# Patient Record
Sex: MALE | Race: WHITE | NOT HISPANIC OR LATINO | URBAN - METROPOLITAN AREA
[De-identification: names, ages, dates, MRNs, and addresses within clinical notes are randomized per-mention and may not be internally consistent; named-entity substitution may affect disease eponyms.]

---

## 2022-10-06 ENCOUNTER — TRANSFERRED RECORDS (OUTPATIENT)
Dept: HEALTH INFORMATION MANAGEMENT | Facility: CLINIC | Age: 47
End: 2022-10-06

## 2022-10-19 ENCOUNTER — MEDICAL CORRESPONDENCE (OUTPATIENT)
Dept: HEALTH INFORMATION MANAGEMENT | Facility: CLINIC | Age: 47
End: 2022-10-19

## 2022-10-19 ENCOUNTER — TRANSFERRED RECORDS (OUTPATIENT)
Dept: HEALTH INFORMATION MANAGEMENT | Facility: CLINIC | Age: 47
End: 2022-10-19

## 2022-10-25 ENCOUNTER — TRANSCRIBE ORDERS (OUTPATIENT)
Dept: OTHER | Age: 47
End: 2022-10-25

## 2022-10-25 DIAGNOSIS — H47.10 OPTIC DISC EDEMA: Primary | ICD-10-CM

## 2022-11-02 ENCOUNTER — OFFICE VISIT (OUTPATIENT)
Dept: OPHTHALMOLOGY | Facility: CLINIC | Age: 47
End: 2022-11-02
Attending: OPHTHALMOLOGY
Payer: COMMERCIAL

## 2022-11-02 DIAGNOSIS — H35.063 RETINAL VASCULITIS OF BOTH EYES: ICD-10-CM

## 2022-11-02 DIAGNOSIS — Z79.899 HIGH RISK MEDICATION USE: ICD-10-CM

## 2022-11-02 DIAGNOSIS — H35.353 CYSTOID MACULAR EDEMA OF BOTH EYES: ICD-10-CM

## 2022-11-02 DIAGNOSIS — H30.23 INTERMEDIATE UVEITIS OF BOTH EYES: Primary | ICD-10-CM

## 2022-11-02 DIAGNOSIS — H47.10 EDEMA OF OPTIC DISC OF RIGHT EYE: ICD-10-CM

## 2022-11-02 PROCEDURE — 92235 FLUORESCEIN ANGRPH MLTIFRAME: CPT | Performed by: OPHTHALMOLOGY

## 2022-11-02 PROCEDURE — 99204 OFFICE O/P NEW MOD 45 MIN: CPT | Mod: GC | Performed by: OPHTHALMOLOGY

## 2022-11-02 PROCEDURE — 92133 CPTRZD OPH DX IMG PST SGM ON: CPT | Performed by: OPHTHALMOLOGY

## 2022-11-02 PROCEDURE — 99207 OCT OPTIC NERVE RNFL SPECTRALIS OU (BOTH EYES): CPT | Mod: 26 | Performed by: OPHTHALMOLOGY

## 2022-11-02 PROCEDURE — 92134 CPTRZ OPH DX IMG PST SGM RTA: CPT | Performed by: OPHTHALMOLOGY

## 2022-11-02 PROCEDURE — G0463 HOSPITAL OUTPT CLINIC VISIT: HCPCS | Mod: 25

## 2022-11-02 RX ORDER — PREDNISOLONE ACETATE 10 MG/ML
1 SUSPENSION/ DROPS OPHTHALMIC 2 TIMES DAILY
COMMUNITY
Start: 2022-10-07 | End: 2022-11-02

## 2022-11-02 RX ORDER — PREDNISOLONE ACETATE 10 MG/ML
1 SUSPENSION/ DROPS OPHTHALMIC 2 TIMES DAILY
Qty: 10 ML | Refills: 4 | Status: SHIPPED | OUTPATIENT
Start: 2022-11-02 | End: 2023-02-03

## 2022-11-02 ASSESSMENT — CONF VISUAL FIELD
OD_NORMAL: 1
OS_INFERIOR_TEMPORAL_RESTRICTION: 0
OS_SUPERIOR_NASAL_RESTRICTION: 0
OD_INFERIOR_NASAL_RESTRICTION: 0
OS_INFERIOR_NASAL_RESTRICTION: 0
OD_SUPERIOR_NASAL_RESTRICTION: 0
METHOD: COUNTING FINGERS
OS_NORMAL: 1
OD_INFERIOR_TEMPORAL_RESTRICTION: 0
OD_SUPERIOR_TEMPORAL_RESTRICTION: 0
OS_SUPERIOR_TEMPORAL_RESTRICTION: 0

## 2022-11-02 ASSESSMENT — EXTERNAL EXAM - RIGHT EYE: OD_EXAM: NORMAL

## 2022-11-02 ASSESSMENT — TONOMETRY
OS_IOP_MMHG: 14
OD_IOP_MMHG: 12
IOP_METHOD: TONOPEN

## 2022-11-02 ASSESSMENT — VISUAL ACUITY
OD_SC: 20/20
METHOD: SNELLEN - LINEAR
OS_SC: 20/25

## 2022-11-02 ASSESSMENT — EXTERNAL EXAM - LEFT EYE: OS_EXAM: NORMAL

## 2022-11-02 ASSESSMENT — SLIT LAMP EXAM - LIDS
COMMENTS: NORMAL
COMMENTS: NORMAL

## 2022-11-02 ASSESSMENT — CUP TO DISC RATIO
OS_RATIO: 0.2
OD_RATIO: 0.1

## 2022-11-02 NOTE — PATIENT INSTRUCTIONS
We will ask you to get call [facility] in [place] to recheck Quantiferon (TB Test) and a chest x-ray to ensure these are normal. Once we get results, we will likely start a course of oral steroid. We will be in touch about this  Continue the same drop 2x/day in each eye for now. This was refilled to your pharmacy

## 2022-11-02 NOTE — NURSING NOTE
Chief Complaints and History of Present Illnesses   Patient presents with     Iritis Evaluation     Chief Complaint(s) and History of Present Illness(es)     Iritis Evaluation            Laterality: left eye    Onset: gradual    Onset: months ago    Quality: States va is the same     Associated symptoms: Negative for dryness, redness, tearing, photophobia, flashes and floaters    Treatments tried: no treatments and eye drops    Pain scale: 0/10          Comments    States that in Sept he went in for redness in the left eye and [facility] noted some swelling of the optic nerve in each eye .   Prednisolone every 6 hours each eye   Sharlene Miller COT 8:49 AM November 2, 2022

## 2022-11-02 NOTE — PROGRESS NOTES
Chief Complaint/Presenting Concern: Uveitis evaluation    History of Present Illness:   Brien James is a 47 year old patient who presents for evaluation of inflammation and optic nerve edema from Dr. Rivera Payne at [facility].    Mr. James reports symptoms became notable after working in the yard in September 2022. He recalls something brushed his lefteye. Things got red in the left eye and then he saw [facility] who diagnosed some inflammation left eye and was given a steroid drop for five days. The vision worsened significantly and then inflammation was seen in the right eye. Drops were continued and this visit was recommended also due to optic nerve edema and cotton wool spots seen left eye. Labs done and noted below.    Mr. James reports vision seems to get blurry with drops, so he has gone down to using them twice daily and this seems to keep things comfortable. There is no redness or irritation. The eye issues do not interfere with day to day activities.     Additional Ocular History:   1. LASIK each eye     Relevant Past Medical/Family/Social History  He reports a history of asthma, but has not required any medications. He denies any personal history of immune deficiency disorders, recent hospitalizations    No known family history of autoimmune disorders. Grandmother with Glaucoma    Born in [place], [place] and works in [employer] for [employer]. He denies any recent Travel outside of the .    No tattoos, tobacco use, heroin use, cocaine use, IV drug use, recent camping or hiking trips, exposure to cats, incarceration, known exposure to active TB, consumption of raw meat, history of STIs. He has a dog at home.     Relevant Review of Systems: Back stiffness transiently. No  rashes, hypopigmented patches of skin, oral ulcers, genital ulcers, joint pain, back pain, difficulty breathing, frequent headaches.    Laboratory Testing  October 2022: Normal/negative: ESR, AICHA, RF, CCP, ACE, Lysozyme,  ANCA, HLA B27, CBC, Toxoplasma, Treponema, Lyme  Abnormal: CRP slightly elevated at 1.7. Quantiferon indeterminate     Current eye related medications: Prednisolone drops 2x/day each eye     Retina/Uveitis Imaging:  OCT Spectralis Macula November 2, 2022  right eye: Small non-foveal cysts with some speckling of ellipsoid zone but no attenuation. NFL thickening with peripapillary IRF with no SRF noted; choroid with normal thickness  left eye: Hyperreflective CWS superiorly, trace small non-foveal cysts, no disruption of ellipsoid zone; no significant peripapillary thickening or fluid, no SRF; choroid with normal thickness    OCT Optic Nerve RNFL Spectralis November 2, 2022  right eye: Average thickness 224 microns, diffusely thick  left eye: Average thickness 124 microns    Optos Fundus Autofluorescence November 2, 2022  right eye: Two hypo-AF lesions with hyper-AF borders in the superotemporal midperiphery  left eye: Hyper-AF corresponding to CWS otherwise normal    Optos FA November 2, 2022  right eye: Normal transit. Macular and significant optic disc leakage; trace large vessel and small vessel leakage temporal periphery  left eye: Macular and moderate disc leakage and trace large vessel and small vessel leakage temporal midperiphery and periphery    Assessment:    1. Intermediate uveitis of both eyes  With a mild degree of anterior vitreous inflammation and a moderate degree of peripheral small vessel leakage on angiography    2. Retinal vasculitis of both eyes  There is nonocclusive focal segmental large vessel hyperfluorescence primarily temporally in both eyes on angiography.  The cotton-wool spot along the superior arcade seen by Dr. Payne persists    3. Cystoid macular edema of both eyes  Small cysts on OCT, disc and macular leakage seen on angiography    4. 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 High risk medication use  We will likely start a course of oral prednisone soon    Plan/Recommendations:    Discussed findings with patient.  There is mild anterior chamber inflammation which is not symptomatic.  Examination and angiography confirmed small and large vessel inflammation in both eyes along with cystoid macular edema (also present by OCT).    The optic disc edema is likely related to the uveitis and not independent.  This is best seen by angiography and there are no associated features of optic nerve dysfunction.    We discussed treatment options including continued use of steroid eyedrops but also the addition of oral steroid given the involvement back of the eye.  We we will obtain a few additional studies as noted below.    Extensive laboratory evaluation to date has been unremarkable for underlying infectious or inflammatory causes of this uveitis.  Although low risk for TB, we will repeat the QuantiFERON test (given recent indeterminate result)  and obtain a chest x-ray to complete that evaluation    Eye pressure is normal in both eyes.    Continue prednisolone twice a day in both eyes. We will likely plan a course of oral prednisone once we confirm the QuantiFERON blood test is negative for TB    RTC 4 weeks, tonopen, AC Check, then dilate with RNFL, OCT (ordered)    Kyle Bryson MD  Retina Fellow    Attending Physician Attestation:  Complete documentation of historical and exam elements from today's encounter can be found in the full encounter summary report (not reduplicated in this progress note). I reviewed the chief complaint(s) and history of present illness, and  confirmed and edited as necessary the review of systems, past medical/surgical history, family history, social history, and examination findings as documented by others and the treating Resident or Fellow Physician.    I examined the patient myself, discussed the findings, reviewed  all ancillary testing data and modified these results and reports along with the assessment and plan with the Treating Resident or Fellow Physician. I agree with the note as detailed above.   Howard Ruiz M.D.  Uveitis and Medical Retina  November 2, 2022

## 2022-11-02 NOTE — LETTER
11/2/2022       RE: Brien James  [address]  [facility] [address]     Dear Colleague,    Thank you for referring your patient, Brien James, to the [facility] [place] at [facility]. Please see a copy of my visit note below.    Chief Complaint/Presenting Concern: Uveitis evaluation.    History of Present Illness:   Brien James is a 47 year old patient who presents for evaluation of inflammation and optic nerve edema from Dr. Rivera Payne at [facility].    Mr. James reports symptoms became notable after working in the yard in September 2022. He recalls something brushed his lefteye. Things got red in the left eye and then he saw [facility] who diagnosed some inflammation left eye and was given a steroid drop for five days. The vision worsened significantly and then inflammation was seen in the right eye. Drops were continued and this visit was recommended also due to optic nerve edema and cotton wool spots seen left eye. Labs done and noted below.    Mr. James reports vision seems to get blurry with drops, so he has gone down to using them twice daily and this seems to keep things comfortable. There is no redness or irritation. The eye issues do not interfere with day to day activities.     Additional Ocular History:   1. LASIK each eye.     Relevant Past Medical/Family/Social History  He reports a history of asthma, but has not required any medications. He denies any personal history of immune deficiency disorders, recent hospitalizations.    No known family history of autoimmune disorders. Grandmother with Glaucoma.    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 He denies any recent Travel outside of the [place].    No tattoos, tobacco use, heroin use, cocaine use, IV drug use, recent camping or hiking trips, exposure to cats, incarceration, known exposure to active TB, consumption of raw meat, history  of STIs. He has a dog at home.     Relevant Review of Systems: Back stiffness transiently. No  rashes, hypopigmented patches of skin, oral ulcers, genital ulcers, joint pain, back pain, difficulty breathing, frequent headaches.    Laboratory Testing  October 2022: Normal/negative: ESR, AICHA, RF, CCP, ACE, Lysozyme, ANCA, HLA B27, CBC, Toxoplasma, Treponema, Lyme  Abnormal: CRP slightly elevated at 1.7. Quantiferon indeterminate     Current eye related medications: Prednisolone drops 2x/day each eye.     Retina/Uveitis Imaging:  OCT Spectralis Macula November 2, 2022  right eye: Small non-foveal cysts with some speckling of ellipsoid zone but no attenuation. NFL thickening with peripapillary IRF with no SRF noted; choroid with normal thickness  left eye: Hyperreflective CWS superiorly, trace small non-foveal cysts, no disruption of ellipsoid zone; no significant peripapillary thickening or fluid, no SRF; choroid with normal thickness    OCT Optic Nerve RNFL Spectralis November 2, 2022  right eye: Average thickness 224 microns, diffusely thick  left eye: Average thickness 124 microns    Optos Fundus Autofluorescence November 2, 2022  right eye: Two hypo-AF lesions with hyper-AF borders in the superotemporal midperiphery  left eye: Hyper-AF corresponding to CWS otherwise normal    Optos FA November 2, 2022  right eye: Normal transit. Macular and significant optic disc leakage; trace large vessel and small vessel leakage temporal periphery  left eye: Macular and moderate disc leakage and trace large vessel and small vessel leakage temporal midperiphery and periphery    Assessment:    1. Intermediate uveitis of both eyes  With a mild degree of anterior vitreous inflammation and a moderate degree of peripheral small vessel leakage on angiography.    2. Retinal vasculitis of both eyes  There is nonocclusive focal segmental large vessel hyperfluorescence primarily temporally in both eyes on angiography.  The cotton-wool spot  along the superior arcade seen by Dr. Payne persists.    3. Cystoid macular edema of both eyes  Small cysts on OCT, disc and macular leakage seen on angiography.    4. 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    5. High risk medication use  We will likely start a course of oral prednisone soon.    Plan/Recommendations:    Discussed findings with patient.  There is mild anterior chamber inflammation which is not symptomatic.  Examination and angiography confirmed small and large vessel inflammation in both eyes along with cystoid macular edema (also present by OCT).    The optic disc edema is likely related to the uveitis and not independent.  This is best seen by angiography and there are no associated features of optic nerve dysfunction.    We discussed treatment options including continued use of steroid eyedrops but also the addition of oral steroid given the involvement back of the eye.  We we will obtain a few additional studies as noted below.    Extensive laboratory evaluation to date has been unremarkable for underlying infectious or inflammatory causes of this uveitis.  Although low risk for TB, we will repeat the QuantiFERON test (given recent indeterminate result)  and obtain a chest x-ray to complete that evaluation.    Eye pressure is normal in both eyes.    Continue prednisolone twice a day in both eyes. We will likely plan a course of oral prednisone once we confirm the QuantiFERON blood test is negative for TB.    RTC 4 weeks, tonopen, AC Check, then dilate with RNFL, OCT (ordered)    Kyle Bryson MD  Retina Fellow    Attending Physician Attestation:  Complete documentation of historical and exam elements from today's encounter can be found in the full encounter summary report (not reduplicated in this progress note). I reviewed the chief complaint(s) and  history of present illness, and  confirmed and edited as necessary the review of systems, past medical/surgical history, family history, social history, and examination findings as documented by others and the treating Resident or Fellow Physician.    I examined the patient myself, discussed the findings, reviewed all ancillary testing data and modified these results and reports along with the assessment and plan with the Treating Resident or Fellow Physician. I agree with the note as detailed above.   Howard Ruiz M.D.  Uveitis and Medical Retina  November 2, 2022      Again, thank you for allowing me to participate in the care of your patient.      Sincerely,    Howard Ruiz MD  [facility] Dept of Ophthalmology  Uveitis and Medical Retina

## 2022-12-10 ENCOUNTER — HEALTH MAINTENANCE LETTER (OUTPATIENT)
Age: 47
End: 2022-12-10

## 2022-12-16 ENCOUNTER — TRANSCRIBE ORDERS (OUTPATIENT)
Dept: OTHER | Age: 47
End: 2022-12-16

## 2022-12-16 DIAGNOSIS — H47.10 OPTIC DISC EDEMA: Primary | ICD-10-CM

## 2022-12-19 ENCOUNTER — OFFICE VISIT (OUTPATIENT)
Dept: OPHTHALMOLOGY | Facility: CLINIC | Age: 47
End: 2022-12-19
Attending: OPHTHALMOLOGY
Payer: COMMERCIAL

## 2022-12-19 DIAGNOSIS — H35.353 CYSTOID MACULAR EDEMA OF BOTH EYES: ICD-10-CM

## 2022-12-19 DIAGNOSIS — Z79.899 HIGH RISK MEDICATION USE: ICD-10-CM

## 2022-12-19 DIAGNOSIS — H47.10 EDEMA OF OPTIC DISC OF RIGHT EYE: ICD-10-CM

## 2022-12-19 DIAGNOSIS — H35.063 RETINAL VASCULITIS OF BOTH EYES: ICD-10-CM

## 2022-12-19 DIAGNOSIS — H30.23 INTERMEDIATE UVEITIS OF BOTH EYES: Primary | ICD-10-CM

## 2022-12-19 PROCEDURE — 99214 OFFICE O/P EST MOD 30 MIN: CPT | Performed by: OPHTHALMOLOGY

## 2022-12-19 PROCEDURE — 92133 CPTRZD OPH DX IMG PST SGM ON: CPT | Performed by: OPHTHALMOLOGY

## 2022-12-19 PROCEDURE — G0463 HOSPITAL OUTPT CLINIC VISIT: HCPCS | Mod: 25

## 2022-12-19 PROCEDURE — 92134 CPTRZ OPH DX IMG PST SGM RTA: CPT | Performed by: OPHTHALMOLOGY

## 2022-12-19 RX ORDER — PREDNISONE 10 MG/1
TABLET ORAL
Qty: 30 TABLET | Refills: 1 | Status: SHIPPED | OUTPATIENT
Start: 2022-12-19 | End: 2023-02-03

## 2022-12-19 ASSESSMENT — EXTERNAL EXAM - RIGHT EYE: OD_EXAM: NORMAL

## 2022-12-19 ASSESSMENT — VISUAL ACUITY
OS_SC+: +1
METHOD: SNELLEN - LINEAR
OS_PH_SC: 20/20
OD_SC: 20/20
OS_SC: 20/50

## 2022-12-19 ASSESSMENT — EXTERNAL EXAM - LEFT EYE: OS_EXAM: NORMAL

## 2022-12-19 ASSESSMENT — CONF VISUAL FIELD
OS_INFERIOR_TEMPORAL_RESTRICTION: 0
OD_SUPERIOR_TEMPORAL_RESTRICTION: 0
OD_NORMAL: 1
METHOD: COUNTING FINGERS
OD_INFERIOR_TEMPORAL_RESTRICTION: 0
OD_SUPERIOR_NASAL_RESTRICTION: 0
OS_SUPERIOR_TEMPORAL_RESTRICTION: 0
OS_NORMAL: 1
OS_SUPERIOR_NASAL_RESTRICTION: 0
OD_INFERIOR_NASAL_RESTRICTION: 0
OS_INFERIOR_NASAL_RESTRICTION: 0

## 2022-12-19 ASSESSMENT — SLIT LAMP EXAM - LIDS
COMMENTS: NORMAL
COMMENTS: NORMAL

## 2022-12-19 ASSESSMENT — TONOMETRY
OD_IOP_MMHG: 13
OS_IOP_MMHG: 13
IOP_METHOD: TONOPEN

## 2022-12-19 ASSESSMENT — CUP TO DISC RATIO
OS_RATIO: 0.2
OD_RATIO: 0.1

## 2022-12-19 NOTE — NURSING NOTE
Chief Complaints and History of Present Illnesses   Patient presents with     Uveitis Follow-Up     Intermediate uveitis of both eyes      Chief Complaint(s) and History of Present Illness(es)     Uveitis Follow-Up            Laterality: both eyes    Onset: gradual    Onset: months ago    Associated symptoms: dryness and itching.  Negative for flashes and floaters    Pain scale: 0/10    Comments: Intermediate uveitis of both eyes           Comments    Pt thinks his eyes feel a little better.   Not as irritated as before.     Current eye related medications:   Prednisolone drops 2x/day each eye   predniSONE (DELTASONE) 10 MG tablet -pt took last pill this morning.     HAILEY HOOKS COA December 19, 2022 3:25 PM

## 2022-12-19 NOTE — PROGRESS NOTES
Chief Complaint/Presenting Concern:  Uveitis follow up    Interval History of Present Ocular Illness:  Brien James is a 47 year old patient who returns for follow up of his uveitis. At last visit, we discussed lab testing and starting prednisone which got started around 3 weeks ago. Mr. James notes things are a little better up close and less dry. Now completed prednisone and still on drops.     Interval Updates to Medical/Family/Social History:  No changes    Relevant Review of Systems Updates:  No stomach issues, no trouble sleeping    Labs: November 2022: Repeat Quantiferon negative. Chest x-ray normal.      Current eye related medications: Prednisone 20 mg daily (last dose today), Prednisolone twice daily in each eye      Retina/Uveitis Imaging:   OCT Spectralis Macula December 19, 2022  right eye: Resolved cysts in macula and improved NFL thickening  left eye: Resolved cysts, no NFL thickening    OCT Optic Nerve RNFL Spectralis December 19, 2022  right eye: Avg thickness 161 microns,improved thickening, no thinning  left eye: Avg thickness 111 microns, improved thickening, no thinning    Assessment:     1. Intermediate uveitis of both eyes  2. Retinal vasculitis of both eyes  There is improved inflammation on exam, no retinal vessel sheathing, resolved cotton-wool spot in the left eye    3. Cystoid macular edema of both eyes  Resolved on OCT    4. 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 High risk medication use  Prednisone 20 mg daily completed today.    Plan/Recommendations:      Discussed findings with patient.  The inflammation has improved in both eyes with the addition of oral prednisone.  The OCT scans showed resolved cystoid macular edema and the optic nerve scans show significant improvement in the optic nerve edema in the right eye.  We discussed that the future course is unknown at this point, but the improvement on prednisone is encouraging.  We discussed the  possibility of recurrence in the future but that it would be reasonable to continue to taper off prednisone and monitor closely.  If there are signs of recurrence off prednisone we can then discuss potential options for long-term management such as other medications which control inflammation such as methotrexate, mycophenolate, azathioprine.  At this time there is no indication to make plans to start such therapy    Eye pressure is normal in both eyes    Recommend additional testing: No blood tests nor x-rays needed     We sent a prescription for prednisone to take 1 pill (10 mg) each AM with food until 1/2/23, then stop.     For the steroid drop, continue once daily. If they still feel tired, you can do one in the evening.    You can try some artificial tears such as Refresh or Systane can help with eye dryness    RTC Week of Jan 30 refract, tonopen, dilate, OCT (ordered)    Physician Attestation     Attending Physician Attestation:  Complete documentation of historical and exam elements from today's encounter can be found in the full encounter summary report (not reduplicated in this progress note). I personally obtained the chief complaint(s) and history of present illness. I confirmed and edited as necessary the review of systems, past medical/surgical history, family history, social history, and examination findings as documented by others; and I examined the patient myself. I personally reviewed the relevant tests, images, and reports as documented above. I formulated and edited as necessary the assessment and plan and discussed the findings and management plan with the patient and family members present at the time of this visit.  Howard Ruiz M.D., Uveitis and Medical Retina, December 19, 2022

## 2022-12-19 NOTE — PATIENT INSTRUCTIONS
Recommend additional testing: No blood tests nor x-rays needed   We sent a prescription for prednisone to take 1 pill (10 mg) each AM with food until 1/2/23, then stop. Call/message if you notice things get blurry again   For the steroid drop, continue once daily. If they still feel tired, you can do one in the evening.  You can try some artificial tears such as Refresh or Systane can help with eye dryness

## 2022-12-19 NOTE — LETTER
12/19/2022       RE: Brien James  [address]  [facility] [address]     Dear Colleague,    Thank you for referring your patient, Brien James, to the [facility] [place] at [facility]. Please see a copy of my visit note below.    Chief Complaint/Presenting Concern:  Uveitis follow up.    Interval History of Present Ocular Illness:  Brien James is a 47 year old patient who returns for follow up of his uveitis. At last visit, we discussed lab testing and starting prednisone which got started around 3 weeks ago. Mr. James notes things are a little better up close and less dry. Now completed prednisone and still on drops.     Interval Updates to Medical/Family/Social History:  No changes.    Relevant Review of Systems Updates:  No stomach issues, no trouble sleeping.    Labs: November 2022: Repeat Quantiferon negative. Chest x-ray normal.      Current eye related medications: Prednisone 20 mg daily (last dose today), Prednisolone twice daily in each eye.      Retina/Uveitis Imaging:   OCT Spectralis Macula December 19, 2022  right eye: Resolved cysts in macula and improved NFL thickening  left eye: Resolved cysts, no NFL thickening    OCT Optic Nerve RNFL Spectralis December 19, 2022  right eye: Avg thickness 161 microns,improved thickening, no thinning  left eye: Avg thickness 111 microns, improved thickening, no thinning    Assessment:     1. Intermediate uveitis of both eyes  2. Retinal vasculitis of both eyes  There is improved inflammation on exam, no retinal vessel sheathing, resolved cotton-wool spot in the left eye.    3. Cystoid macular edema of both eyes  Resolved on OCT.    4. 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    5. High risk medication use  Prednisone 20 mg daily completed today.    Plan/Recommendations:      Discussed findings with patient.  The inflammation has improved in both eyes with the  addition of oral prednisone.  The OCT scans showed resolved cystoid macular edema and the optic nerve scans show significant improvement in the optic nerve edema in the right eye.  We discussed that the future course is unknown at this point, but the improvement on prednisone is encouraging.  We discussed the possibility of recurrence in the future but that it would be reasonable to continue to taper off prednisone and monitor closely.  If there are signs of recurrence off prednisone we can then discuss potential options for long-term management such as other medications which control inflammation such as methotrexate, mycophenolate, azathioprine.  At this time there is no indication to make plans to start such therapy.    Eye pressure is normal in both eyes.    Recommend additional testing: No blood tests nor x-rays needed.     We sent a prescription for prednisone to take 1 pill (10 mg) each AM with food until 1/2/23, then stop.     For the steroid drop, continue once daily. If they still feel tired, you can do one in the evening.    You can try some artificial tears such as Refresh or Systane can help with eye dryness.    RTC Week of Jan 30 refract, tonopen, dilate, OCT (ordered)    Physician Attestation     Attending Physician Attestation:  Complete documentation of historical and exam elements from today's encounter can be found in the full encounter summary report (not reduplicated in this progress note). I personally obtained the chief complaint(s) and history of present illness. I confirmed and edited as necessary the review of systems, past medical/surgical history, family history, social history, and examination findings as documented by others; and I examined the patient myself. I personally reviewed the relevant tests, images, and reports as documented above. I formulated and edited as necessary the assessment and plan and discussed the findings and management plan with the patient and family members  present at the time of this visit.  Howard Ruiz M.D., Uveitis and Medical Retina, December 19, 2022       Again, thank you for allowing me to participate in the care of your patient.      Sincerely,    Howard Ruiz MD  [facility] Dept of Ophthalmology  Uveitis and Medical Retina

## 2023-02-03 ENCOUNTER — OFFICE VISIT (OUTPATIENT)
Dept: OPHTHALMOLOGY | Facility: CLINIC | Age: 48
End: 2023-02-03
Attending: OPHTHALMOLOGY
Payer: COMMERCIAL

## 2023-02-03 DIAGNOSIS — H35.353 CYSTOID MACULAR EDEMA OF BOTH EYES: ICD-10-CM

## 2023-02-03 DIAGNOSIS — H47.10 EDEMA OF OPTIC DISC OF RIGHT EYE: ICD-10-CM

## 2023-02-03 DIAGNOSIS — H30.23 INTERMEDIATE UVEITIS OF BOTH EYES: Primary | ICD-10-CM

## 2023-02-03 DIAGNOSIS — Z79.899 HIGH RISK MEDICATION USE: ICD-10-CM

## 2023-02-03 DIAGNOSIS — H35.063 RETINAL VASCULITIS OF BOTH EYES: ICD-10-CM

## 2023-02-03 PROCEDURE — 99214 OFFICE O/P EST MOD 30 MIN: CPT | Performed by: OPHTHALMOLOGY

## 2023-02-03 PROCEDURE — 92134 CPTRZ OPH DX IMG PST SGM RTA: CPT | Performed by: OPHTHALMOLOGY

## 2023-02-03 PROCEDURE — G0463 HOSPITAL OUTPT CLINIC VISIT: HCPCS | Mod: 25

## 2023-02-03 PROCEDURE — 92015 DETERMINE REFRACTIVE STATE: CPT

## 2023-02-03 ASSESSMENT — TONOMETRY
OD_IOP_MMHG: 13
OS_IOP_MMHG: 15
IOP_METHOD: TONOPEN

## 2023-02-03 ASSESSMENT — VISUAL ACUITY
OS_SC: 20/30
OD_SC: 20/20
METHOD: SNELLEN - LINEAR
OS_SC+: +2

## 2023-02-03 ASSESSMENT — REFRACTION_MANIFEST
OD_SPHERE: -0.25
OD_AXIS: 135
OD_CYLINDER: +0.50
OD_ADD: +1.50
OS_CYLINDER: +1.00
OS_ADD: +1.50
OS_SPHERE: +0.50
OS_AXIS: 005

## 2023-02-03 ASSESSMENT — CONF VISUAL FIELD
OS_INFERIOR_NASAL_RESTRICTION: 0
OD_INFERIOR_NASAL_RESTRICTION: 0
OD_INFERIOR_TEMPORAL_RESTRICTION: 0
METHOD: COUNTING FINGERS
OD_NORMAL: 1
OS_SUPERIOR_NASAL_RESTRICTION: 0
OD_SUPERIOR_NASAL_RESTRICTION: 0
OS_INFERIOR_TEMPORAL_RESTRICTION: 0
OD_SUPERIOR_TEMPORAL_RESTRICTION: 0
OS_NORMAL: 1
OS_SUPERIOR_TEMPORAL_RESTRICTION: 0

## 2023-02-03 ASSESSMENT — CUP TO DISC RATIO
OD_RATIO: 0.1
OS_RATIO: 0.2

## 2023-02-03 ASSESSMENT — SLIT LAMP EXAM - LIDS
COMMENTS: NORMAL
COMMENTS: NORMAL

## 2023-02-03 ASSESSMENT — EXTERNAL EXAM - RIGHT EYE: OD_EXAM: NORMAL

## 2023-02-03 ASSESSMENT — EXTERNAL EXAM - LEFT EYE: OS_EXAM: NORMAL

## 2023-02-03 NOTE — LETTER
2/3/2023       RE: Brien James  [address]  [facility] [address]     Dear Colleague,    Thank you for referring your patient, Brien James, to the [facility] [place] at [facility]. Please see a copy of my visit note below.    Chief Complaint/Presenting Concern:  Uveitis follow up    Interval History of Present Ocular Illness:  Brien James is a 47 year old patient who returns for follow up of his intermediate uveitis, retinal vasculitis and cystoid macular edema.  We last saw each other on December 19, 2022 at which time things were doing well on prednisone with resolved macular edema.  Recommended a slow taper of prednisone and to follow-up today.    Mr. James has been doing well now off prednisone. The eyes get tired sometimes or dry, but no flashing lights or floating spots. There are some challenges with reading paper, but otherwise no issues.     Interval Updates to Medical/Family/Social History:  No changes    Relevant Review of Systems Updates:  No updates    Labs: None since last visit     Current eye related medications: No prednisone for a few weeks, artificial tears daily.    Retina/Uveitis Imaging:   OCT Spectralis Macula February 3, 2023  right eye: Normal contour, no fluid. Improving disc edema, no PP fluid.   left eye: Normal contour, no fluid. No disc edema        Assessment:     1. Intermediate uveitis of both eyes  No haze in either eye    2. Retinal vasculitis of both eyes  Minimal sheathing left eye only, no occlusions    3. Cystoid macular edema of both eyes  No recurrence    4. 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     5. High risk medication use  Now off prednisone    Plan/Recommendations:      Discussed findings with patient. There is no recurrence of the uveitis nor disc edema off prednisone. We can continue to monitor as there is no macular edema    Eye  pressure is normal in both eyes    Recommend additional testing: None at this time    Continue artificial tears when needed    No steroid drops and no oral prednisone needed    There is an improvement in the visual acuity particularly in the left eye with an updated eyeglasses prescription.  You can take this to any place to get glasses made.    RTC 2 months tonopen, dilate, OCT (ordered)    Physician Attestation     Attending Physician Attestation:  Complete documentation of historical and exam elements from today's encounter can be found in the full encounter summary report (not reduplicated in this progress note). I personally obtained the chief complaint(s) and history of present illness. I confirmed and edited as necessary the review of systems, past medical/surgical history, family history, social history, and examination findings as documented by others; and I examined the patient myself. I personally reviewed the relevant tests, images, and reports as documented above. I formulated and edited as necessary the assessment and plan and discussed the findings and management plan with the patient and family members present at the time of this visit.  Howard Ruiz M.D., Uveitis and Medical Retina, February 3, 2023     Again, thank you for allowing me to participate in the care of your patient.      Sincerely,    Howard Ruiz MD  [facility] Dept of Ophthalmology  Uveitis and Medical Retina

## 2023-02-03 NOTE — PATIENT INSTRUCTIONS
Continue artificial tears when needed  No steroid drops and no oral prednisone needed  There is an improvement in the visual acuity particularly in the left eye with an updated eyeglasses prescription.  You can take this to any place to get glasses made.

## 2023-02-03 NOTE — PROGRESS NOTES
Chief Complaint/Presenting Concern:  Uveitis follow up    Interval History of Present Ocular Illness:  Brien James is a 47 year old patient who returns for follow up of his intermediate uveitis, retinal vasculitis and cystoid macular edema.  We last saw each other on December 19, 2022 at which time things were doing well on prednisone with resolved macular edema.  Recommended a slow taper of prednisone and to follow-up today.    Mr. James has been doing well now off prednisone. The eyes get tired sometimes or dry, but no flashing lights or floating spots. There are some challenges with reading paper, but otherwise no issues.     Interval Updates to Medical/Family/Social History:  No changes    Relevant Review of Systems Updates:  No updates    Labs: None since last visit     Current eye related medications: No prednisone for a few weeks, artificial tears daily.    Retina/Uveitis Imaging:   OCT Spectralis Macula February 3, 2023  right eye: Normal contour, no fluid. Improving disc edema, no PP fluid.   left eye: Normal contour, no fluid. No disc edema    Assessment:     1. Intermediate uveitis of both eyes  No haze in either eye    2. Retinal vasculitis of both eyes  Minimal sheathing left eye only, no occlusions    3. Cystoid macular edema of both eyes  No recurrence    4. 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     5. High risk medication use  Now off prednisone    Plan/Recommendations:      Discussed findings with patient. There is no recurrence of the uveitis nor disc edema off prednisone. We can continue to monitor as there is no macular edema    Eye pressure is normal in both eyes    Recommend additional testing: None at this time    Continue artificial tears when needed    No steroid drops and no oral prednisone needed    There is an improvement in the visual acuity particularly in the left eye with an updated eyeglasses prescription.  You can take this to any  place to get glasses made.    RTC 2 months tonopen, dilate, OCT (ordered)    Physician Attestation     Attending Physician Attestation:  Complete documentation of historical and exam elements from today's encounter can be found in the full encounter summary report (not reduplicated in this progress note). I personally obtained the chief complaint(s) and history of present illness. I confirmed and edited as necessary the review of systems, past medical/surgical history, family history, social history, and examination findings as documented by others; and I examined the patient myself. I personally reviewed the relevant tests, images, and reports as documented above. I formulated and edited as necessary the assessment and plan and discussed the findings and management plan with the patient and family members present at the time of this visit.  Howard Ruiz M.D., Uveitis and Medical Retina, February 3, 2023

## 2023-02-03 NOTE — NURSING NOTE
Chief Complaints and History of Present Illnesses   Patient presents with     Uveitis Follow-Up     Chief Complaint(s) and History of Present Illness(es)     Uveitis Follow-Up            Laterality: both eyes    Onset: gradual    Onset: months ago    Quality: States va is the same since last visit      Severity: moderate    Frequency: intermittently    Associated symptoms: Negative for photophobia, flashes and floaters    Pain scale: 0/10          Comments    Here for Intermediate uveitis of both eyes  AT every day  Sharlene Miller COT 7:26 AM February 3, 2023

## 2024-01-14 ENCOUNTER — HEALTH MAINTENANCE LETTER (OUTPATIENT)
Age: 49
End: 2024-01-14

## 2025-01-26 ENCOUNTER — HEALTH MAINTENANCE LETTER (OUTPATIENT)
Age: 50
End: 2025-01-26